# Patient Record
Sex: MALE | Race: WHITE
[De-identification: names, ages, dates, MRNs, and addresses within clinical notes are randomized per-mention and may not be internally consistent; named-entity substitution may affect disease eponyms.]

---
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
## 2018-02-14 ENCOUNTER — HOSPITAL ENCOUNTER (OUTPATIENT)
Dept: HOSPITAL 39 - GMAJ | Age: 18
End: 2018-02-14
Attending: FAMILY MEDICINE
Payer: COMMERCIAL

## 2018-02-14 DIAGNOSIS — D50.8: Primary | ICD-10-CM

## 2018-04-26 ENCOUNTER — HOSPITAL ENCOUNTER (OUTPATIENT)
Dept: HOSPITAL 39 - GMAJ | Age: 18
End: 2018-04-26
Attending: FAMILY MEDICINE
Payer: COMMERCIAL

## 2018-04-26 DIAGNOSIS — D50.8: Primary | ICD-10-CM

## 2021-01-09 ENCOUNTER — HOSPITAL ENCOUNTER (EMERGENCY)
Dept: HOSPITAL 39 - ER | Age: 21
Discharge: HOME | End: 2021-01-09
Payer: COMMERCIAL

## 2021-01-09 VITALS — SYSTOLIC BLOOD PRESSURE: 122 MMHG | DIASTOLIC BLOOD PRESSURE: 74 MMHG

## 2021-01-09 VITALS — TEMPERATURE: 97.8 F | OXYGEN SATURATION: 99 %

## 2021-01-09 DIAGNOSIS — T18.128A: Primary | ICD-10-CM

## 2021-01-09 DIAGNOSIS — Z88.8: ICD-10-CM

## 2021-01-09 PROCEDURE — 80048 BASIC METABOLIC PNL TOTAL CA: CPT

## 2021-01-09 PROCEDURE — 36415 COLL VENOUS BLD VENIPUNCTURE: CPT

## 2021-01-09 PROCEDURE — 71045 X-RAY EXAM CHEST 1 VIEW: CPT

## 2021-01-09 PROCEDURE — 85025 COMPLETE CBC W/AUTO DIFF WBC: CPT

## 2021-01-09 NOTE — ED.PDOC
History of Present Illness





- General


Time Seen by Provider: 01/09/21 04:11


Source: patient





- History of Present Illness


Initial Comments: 





20-year-old male who presents with chief complaint of food stuck in his throat. 

Patient reports he last ate 9 hours ago.  States he was eating a bite of chicken

fried steak when it became stuck in his esophagus just below the Tien's apple.  

Reports he attempted to drink some water but was unable to swallow around food 

bolus.  He reports severe searing pain when he attempts to swallow.  He reports 

this is happened a few times in the past but usually he is able to eventually 

swallow the food bolus and has never been seen in the ED for this issue.  

Currently he is able to manage his secretions.  He denies any shortness of 

breath, chest pain, cough, abdominal pain, nausea/vomiting, hoarse/muffled 

voice, trismus.





He has never had an endoscopy before.  Does not have a regular doctor.  Denies 

any alcohol, tobacco, drug use.





Allergies/Adverse Reactions: 


Allergies





Diphenhydramine [From Benadryl] Allergy (Verified 12/29/17 23:21)


   





Home Medications: 


Ambulatory Orders





Acetaminophen W/ Codeine [Tylenol W/ CODEINE #3] 1 ea PO Q6HRS #24  12/29/17 


SILVER SULFADIAZINE 1 % 25gm [Silvadene Cream 25gm] 25 gm TOP BID #1 tube 

12/29/17 











Review of Systems





- Review of Systems


Review of Systems: 





01/09/21 04:35


as per HPI





All other Systems: Reviewed and Negative





Past Medical History (General)





- Patient Medical History


Hx Seizures: No


Hx Stroke: No


Hx Dementia: No


Hx Asthma: No


Hx of COPD: No


Hx Cardiac Disorders: No


Hx Congestive Heart Failure: No


Hx Pacemaker: No


Hx Hypertension: No


Hx Thyroid Disease: No


Hx Diabetes: No


Hx Gastroesophageal Reflux: No


Hx Renal Disease: No


Hx Cancer: No


Hx of HIV: No


Hx Hepatitis C: No


Hx MRSA: No





- Vaccination History


Hx Tetanus, Diphtheria Vaccination: No


Hx Influenza Vaccination: No


Hx Pneumococcal Vaccination: No





- Social History


Hx Tobacco Use: No


Hx Alcohol Use: No


Hx Substance Use: No


Hx Substance Use Treatment: No


Hx Depression: No





Family Medical History





- Family History


  ** Mother


Living Status: Still Living





Physical Exam





- Physical Exam


General Appearance: Alert, Comfortable, No apparent distress


Eye Exam: bilateral normal


Ears, Nose, Throat: hearing grossly normal, normal ENT inspection, normal 

pharynx


Neck: non-tender, full range of motion, supple, normal inspection


Respiratory: chest non-tender, lungs clear, normal breath sounds, no respiratory

distress, no accessory muscle use


Cardiovascular/Chest: normal peripheral pulses, regular rate, rhythm, no edema, 

no gallop, no JVD, no murmur


Peripheral Pulses: radial,right: 2+, radial,left: 2+


Gastrointestinal/Abdominal: normal bowel sounds, non tender, soft, no 

organomegaly


Back Exam: normal inspection


Extremity: normal inspection


Neurologic: CNs II-XII nml as tested, no motor/sensory deficits, alert, normal 

mood/affect, oriented x 3


Skin Exam: normal color, warm/dry





Progress





- Progress


Progress: 





01/09/21 04:36


Food bolus impaction


-will obtain basic labs, XR chest, CT imaging soft tissue neck and chest


-place PIV, trial of glucagon 1 mg IV


-plan for likely transfer for urgent GI consultation/endoscopy





01/09/21 05:57


-Lab work and chest x-ray appear unremarkable


-Patient reported resolution of food bolus impaction following the glucagon.  He

tolerated oral fluids well without issue.  He remains stable.  Will discharge to

home in good condition, return warnings discussed at length.  I advised the 

patient to eat a soft mechanical diet for the next 24 to 48 hours and then 

gradually advance as tolerated.  Discussed to be careful with getting tough 

meats.  Discussed to follow-up with his PCP in the clinic and he may need 

outpatient gastroenterology referral for endoscopy as this seems to be a 

recurrent issue.





Dago Lara MD


Billing #[id number]











                                        





01/09/21 04:24


IV Care:Saline Lock per [facility] QSHIFT 


Telemetry .ONCE 


Sodium Chloride 0.9% (Flush) [Saline Flush Syringe]   10 ml IV PRN PRN 





01/09/21 04:25


BMP [BASIC METABOLIC PANEL] Stat 





01/09/21 09:00


Pulse Ox Daily 








                         Laboratory Results - last 24 hr











  01/09/21 01/09/21





  04:24 04:25


 


WBC  7.0 


 


RBC  4.92 


 


Hgb  14.6 


 


Hct  43.3 


 


MCV  88.2 


 


MCH  29.8 


 


MCHC  33.8 


 


RDW  13.3 


 


Plt Count  207 


 


MPV  8.9 


 


Absolute Neuts (auto)  4.10 


 


Absolute Lymphs (auto)  1.50 


 


Absolute Monos (auto)  0.50 


 


Absolute Eos (auto)  0.80 H 


 


Absolute Basos (auto)  0.10 


 


Neutrophils %  58.6 


 


Lymphocytes %  21.4 


 


Monocytes %  7.2 


 


Eosinophils %  11.6 H 


 


Basophils %  1.2 


 


Sodium   140


 


Potassium   4.3


 


Chloride   105


 


Carbon Dioxide   26


 


Anion Gap   13.3


 


Calcium   9.5














- EKG/XRAY/CT


XRAY: chest - no acute processes per my read





Departure





- Departure


Clinical Impression: 


 Esophageal obstruction due to food impaction





Time of Disposition: 05:51


Disposition: Discharge to Home or Self Care


Condition: Good


Instructions:  Acid Reflux and GERD in Adults (DC), Dysphagia (DC)


Diet: other - soft mechanical diet, advance slowly as tolerated


Activity: increase activity as tolerated


Referrals: 


Orestes Lazaro MD [Primary Care Provider] - 1-2 Weeks


Home Medications: 


Ambulatory Orders





Acetaminophen W/ Codeine [Tylenol W/ CODEINE #3] 1 ea PO Q6HRS #24  12/29/17 


SILVER SULFADIAZINE 1 % 25gm [Silvadene Cream 25gm] 25 gm TOP BID #1 tube 

12/29/17 








Additional Instructions: 


You may receive a soft mechanical diet for the next 24 to 48 hours and then 

after that increase your diet gradually as tolerated.  Return the ED if symptoms

return worsen or other concerning symptoms develop such as trouble swallowing, 

muffled voice, fevers, trouble managing secretions, etc.  You will need to 

follow-up with your primary care doctor in the next 1 to 2 weeks for repeat 

evaluation or sooner as needed.  You may need outpatient referral to 

gastroenterology for further evaluation and possible endoscopy.

## 2021-01-09 NOTE — RAD
EXAM DESCRIPTION:  Chest,1 View



CLINICAL HISTORY: food bolus impaction upper esophagus



COMPARISON: None.



FINDINGS: Single frontal radiograph view of the chest. 



Cardiomediastinal silhouette: Normal size and contour. 

Lungs: No consolidation, pneumothorax, or pleural effusion. 

Bones: No acute osseous abnormality. 

Upper abdomen: No abnormality identified.



IMPRESSION:



1. No acute pulmonary process identified.



Electronically signed by:  Donato Herrmann  1/9/2021 4:54 AM

CST Workstation: [id number]